# Patient Record
Sex: FEMALE | Race: WHITE | NOT HISPANIC OR LATINO | URBAN - METROPOLITAN AREA
[De-identification: names, ages, dates, MRNs, and addresses within clinical notes are randomized per-mention and may not be internally consistent; named-entity substitution may affect disease eponyms.]

---

## 2020-07-22 ENCOUNTER — CLAIMS CREATED FROM THE CLAIM WINDOW (OUTPATIENT)
Dept: URBAN - METROPOLITAN AREA CLINIC 94 | Facility: CLINIC | Age: 85
End: 2020-07-22
Payer: MEDICARE

## 2020-07-22 ENCOUNTER — WEB ENCOUNTER (OUTPATIENT)
Dept: URBAN - METROPOLITAN AREA CLINIC 94 | Facility: CLINIC | Age: 85
End: 2020-07-22

## 2020-07-22 DIAGNOSIS — K64.8 EXTERNAL HEMORRHOIDS: ICD-10-CM

## 2020-07-22 PROCEDURE — 99203 OFFICE O/P NEW LOW 30 MIN: CPT | Performed by: PHYSICIAN ASSISTANT

## 2020-07-22 PROCEDURE — G9903 PT SCRN TBCO ID AS NON USER: HCPCS | Performed by: PHYSICIAN ASSISTANT

## 2020-07-22 PROCEDURE — G8420 CALC BMI NORM PARAMETERS: HCPCS | Performed by: PHYSICIAN ASSISTANT

## 2020-07-22 PROCEDURE — G8427 DOCREV CUR MEDS BY ELIG CLIN: HCPCS | Performed by: PHYSICIAN ASSISTANT

## 2020-07-22 PROCEDURE — 1036F TOBACCO NON-USER: CPT | Performed by: PHYSICIAN ASSISTANT

## 2020-07-22 RX ORDER — LIDOCAINE 5% 5 G/100G
AS DIRECTED CREAM TOPICAL THREE TIMES A DAY
Qty: 30 | Refills: 0 | OUTPATIENT
Start: 2020-07-22

## 2020-07-22 RX ORDER — VITAMIN A 2400 MCG
1 TABLET CAPSULE ORAL ONCE A DAY
Status: ACTIVE | COMMUNITY

## 2020-07-22 RX ORDER — ASPIRIN 325 MG/1
1 TABLET TABLET, FILM COATED ORAL ONCE A DAY
Status: ACTIVE | COMMUNITY

## 2020-07-22 RX ORDER — OXYCODONE HYDROCHLORIDE AND ACETAMINOPHEN 10; 325 MG/1; MG/1
1 TABLET AS NEEDED TABLET ORAL
Status: ACTIVE | COMMUNITY

## 2020-07-22 RX ORDER — CLONAZEPAM 0.5 MG/1
1 TABLET AT BEDTIME TABLET ORAL ONCE A DAY
Status: ACTIVE | COMMUNITY

## 2020-07-22 RX ORDER — SODIUM POLYSTYRENE SULFONATE 15 G/60ML
60 ML SUSPENSION ORAL; RECTAL ONCE A DAY
Status: ACTIVE | COMMUNITY

## 2020-07-22 RX ORDER — PREDNISONE 2.5 MG/1
1 TABLET TABLET ORAL ONCE A DAY
Status: ACTIVE | COMMUNITY

## 2020-07-22 RX ORDER — AMLODIPINE BESYLATE 10 MG/1
1 TABLET TABLET ORAL ONCE A DAY
Status: ACTIVE | COMMUNITY

## 2020-07-22 RX ORDER — GABAPENTIN 100 MG/1
1 CAPSULE CAPSULE ORAL ONCE A DAY
Status: ACTIVE | COMMUNITY

## 2020-07-22 RX ORDER — METOPROLOL TARTRATE 25 MG/1
1 TABLET WITH FOOD TABLET, FILM COATED ORAL TWICE A DAY
Status: ACTIVE | COMMUNITY

## 2020-07-22 RX ORDER — HYDROCORTISONE 25 MG/G
1 APPLICATION CREAM TOPICAL TWICE A DAY
Qty: 1 TUBE | Refills: 1 | OUTPATIENT
Start: 2020-07-22 | End: 2020-09-20

## 2020-07-22 NOTE — HPI-TODAY'S VISIT:
The patient is being evaluated today accompanied by her daughter. She reports anal pain/burning and itching. She reports this to be present x 3 days and severe two days ago. She began using Anusol and Tucks which has helped some. She reports a hx of hemorrhoids in the past. She denies changes in bowel movements, diarrhea or constipation. She denies hematochezia or melena. She denies abdominal pain. The patient denies ever having a colonoscopy.

## 2020-07-22 NOTE — PHYSICAL EXAM GASTROINTESTINAL
Abdomen , soft, nontender, nondistended , no guarding or rigidity , no masses palpable , normal bowel sounds , Liver and Spleen , no hepatomegaly present , no hepatosplenomegaly , liver nontender , spleen not palpable , Rectal , normal sphincter tone , external hemorrhoids present without thrombosis,  no rectal masses or bleeding present

## 2020-07-27 ENCOUNTER — TELEPHONE ENCOUNTER (OUTPATIENT)
Dept: URBAN - METROPOLITAN AREA CLINIC 23 | Facility: CLINIC | Age: 85
End: 2020-07-27

## 2020-07-29 ENCOUNTER — OFFICE VISIT (OUTPATIENT)
Dept: URBAN - METROPOLITAN AREA CLINIC 94 | Facility: CLINIC | Age: 85
End: 2020-07-29

## 2020-07-31 ENCOUNTER — OFFICE VISIT (OUTPATIENT)
Dept: URBAN - METROPOLITAN AREA CLINIC 94 | Facility: CLINIC | Age: 85
End: 2020-07-31
Payer: MEDICARE

## 2020-07-31 ENCOUNTER — DASHBOARD ENCOUNTERS (OUTPATIENT)
Age: 85
End: 2020-07-31

## 2020-07-31 DIAGNOSIS — K64.8 BLEEDING INTERNAL HEMORRHOIDS: ICD-10-CM

## 2020-07-31 DIAGNOSIS — K64.9 HEMORRHOIDS: ICD-10-CM

## 2020-07-31 PROCEDURE — G8420 CALC BMI NORM PARAMETERS: HCPCS | Performed by: PHYSICIAN ASSISTANT

## 2020-07-31 PROCEDURE — G8427 DOCREV CUR MEDS BY ELIG CLIN: HCPCS | Performed by: PHYSICIAN ASSISTANT

## 2020-07-31 PROCEDURE — 1036F TOBACCO NON-USER: CPT | Performed by: INTERNAL MEDICINE

## 2020-07-31 PROCEDURE — 99213 OFFICE O/P EST LOW 20 MIN: CPT | Performed by: PHYSICIAN ASSISTANT

## 2020-07-31 PROCEDURE — 1036F TOBACCO NON-USER: CPT | Performed by: PHYSICIAN ASSISTANT

## 2020-07-31 PROCEDURE — G8420 CALC BMI NORM PARAMETERS: HCPCS | Performed by: INTERNAL MEDICINE

## 2020-07-31 PROCEDURE — G8427 DOCREV CUR MEDS BY ELIG CLIN: HCPCS | Performed by: INTERNAL MEDICINE

## 2020-07-31 PROCEDURE — 99213 OFFICE O/P EST LOW 20 MIN: CPT | Performed by: INTERNAL MEDICINE

## 2020-07-31 PROCEDURE — G9903 PT SCRN TBCO ID AS NON USER: HCPCS | Performed by: PHYSICIAN ASSISTANT

## 2020-07-31 RX ORDER — VITAMIN A 2400 MCG
1 TABLET CAPSULE ORAL ONCE A DAY
Status: ACTIVE | COMMUNITY

## 2020-07-31 RX ORDER — PREDNISONE 2.5 MG/1
1 TABLET TABLET ORAL ONCE A DAY
Status: ACTIVE | COMMUNITY

## 2020-07-31 RX ORDER — AMLODIPINE BESYLATE 10 MG/1
1 TABLET TABLET ORAL ONCE A DAY
Status: ACTIVE | COMMUNITY

## 2020-07-31 RX ORDER — LIDOCAINE 5% 5 G/100G
AS DIRECTED CREAM TOPICAL THREE TIMES A DAY
Qty: 30 | Refills: 0 | Status: ACTIVE | COMMUNITY
Start: 2020-07-22

## 2020-07-31 RX ORDER — ASPIRIN 325 MG/1
1 TABLET TABLET, FILM COATED ORAL ONCE A DAY
Status: ACTIVE | COMMUNITY

## 2020-07-31 RX ORDER — GABAPENTIN 100 MG/1
1 CAPSULE CAPSULE ORAL ONCE A DAY
Status: ACTIVE | COMMUNITY

## 2020-07-31 RX ORDER — OXYCODONE HYDROCHLORIDE AND ACETAMINOPHEN 10; 325 MG/1; MG/1
1 TABLET AS NEEDED TABLET ORAL
Status: ACTIVE | COMMUNITY

## 2020-07-31 RX ORDER — METOPROLOL TARTRATE 25 MG/1
1 TABLET WITH FOOD TABLET, FILM COATED ORAL TWICE A DAY
Status: ACTIVE | COMMUNITY

## 2020-07-31 RX ORDER — SODIUM POLYSTYRENE SULFONATE 15 G/60ML
60 ML SUSPENSION ORAL; RECTAL ONCE A DAY
Status: ACTIVE | COMMUNITY

## 2020-07-31 RX ORDER — HYDROCORTISONE 25 MG/G
1 APPLICATION CREAM TOPICAL TWICE A DAY
Qty: 1 TUBE | Refills: 1 | Status: ACTIVE | COMMUNITY
Start: 2020-07-22 | End: 2020-09-20

## 2020-07-31 RX ORDER — CLONAZEPAM 0.5 MG/1
1 TABLET AT BEDTIME TABLET ORAL ONCE A DAY
Status: ACTIVE | COMMUNITY

## 2020-07-31 NOTE — HPI-TODAY'S VISIT:
The patient returns today for follow-up visit accompanied by her daughter. She states that her hemorrhoids are feeling much better with the steroid cream. She has also been doing Sitz Baths twice/day which she feels also helps. The patient states that she has an area over her buttocks that burns and she would like for that to be examined. She denies any additional complaints.

## 2020-07-31 NOTE — PHYSICAL EXAM GASTROINTESTINAL
Abdomen , soft, nontender, nondistended , no guarding or rigidity , no masses palpable , normal bowel sounds , Liver and Spleen , no hepatomegaly present , no hepatosplenomegaly , liver nontender , spleen not palpable , Rectal , normal sphincter tone , improved swelling with hemorrhoids present, rectal masses or bleeding present. skin very thin over right buttocks with erythema noted. Also rash present over upper buttocks.

## 2020-07-31 NOTE — PHYSICAL EXAM CHEST:
no lesions,  no deformities,  no traumatic injuries,  no significant scars are present,  chest wall non-tender,  no masses present, breathing is unlabored without accessory muscle use, normal breath sounds

## 2020-07-31 NOTE — PHYSICAL EXAM CONSTITUTIONAL:
well developed, well nourished , in no acute distress , ambulating with walker normal communication ability